# Patient Record
Sex: FEMALE | URBAN - METROPOLITAN AREA
[De-identification: names, ages, dates, MRNs, and addresses within clinical notes are randomized per-mention and may not be internally consistent; named-entity substitution may affect disease eponyms.]

---

## 2018-07-02 ENCOUNTER — OFFICE VISIT (OUTPATIENT)
Dept: URBAN - METROPOLITAN AREA CLINIC 33 | Facility: CLINIC | Age: 68
End: 2018-07-02
Payer: MEDICARE

## 2018-07-02 PROCEDURE — 92014 COMPRE OPH EXAM EST PT 1/>: CPT | Performed by: OPHTHALMOLOGY

## 2018-07-02 PROCEDURE — 92134 CPTRZ OPH DX IMG PST SGM RTA: CPT | Performed by: OPHTHALMOLOGY

## 2018-07-02 ASSESSMENT — INTRAOCULAR PRESSURE
OS: 11
OD: 11

## 2018-07-02 NOTE — IMPRESSION/PLAN
Impression: Other long term drug therapy: Z79.899. Condition: established, stable. Plaquinil 200mg BID since 2008. Plan: Discussed diagnosis in detail with patient. No treatment is required at this time. Will continue to observe condition and or symptoms. Call if [place] worsens. No plaqunil toxicity seen in exam or OCT. Will order a VF 10-2 red for baseline.

## 2018-07-02 NOTE — IMPRESSION/PLAN
Impression: Lupus erythematosus: L93.0. Condition: established, stable. Plan: Discussed diagnosis in detail with patient. No treatment is required at this time.  See notes above,

## 2019-01-02 ENCOUNTER — TESTING ONLY (OUTPATIENT)
Dept: URBAN - METROPOLITAN AREA CLINIC 33 | Facility: CLINIC | Age: 69
End: 2019-01-02
Payer: MEDICARE

## 2019-01-02 PROCEDURE — 92083 EXTENDED VISUAL FIELD XM: CPT | Performed by: OPHTHALMOLOGY

## 2019-01-14 ENCOUNTER — OFFICE VISIT (OUTPATIENT)
Dept: URBAN - METROPOLITAN AREA CLINIC 33 | Facility: CLINIC | Age: 69
End: 2019-01-14
Payer: MEDICARE

## 2019-01-14 DIAGNOSIS — L93.0 LUPUS ERYTHEMATOSUS: Primary | ICD-10-CM

## 2019-01-14 PROCEDURE — 92134 CPTRZ OPH DX IMG PST SGM RTA: CPT | Performed by: OPHTHALMOLOGY

## 2019-01-14 PROCEDURE — 92014 COMPRE OPH EXAM EST PT 1/>: CPT | Performed by: OPHTHALMOLOGY

## 2019-01-14 ASSESSMENT — INTRAOCULAR PRESSURE
OS: 13
OD: 13

## 2019-01-14 NOTE — IMPRESSION/PLAN
Impression: Lupus erythematosus: L93.0. Condition: established, stable. Plan: Discussed diagnosis in detail with patient. Exam shows no RPE changes OU and VF 10 - 2 shows areas of decrease sensitivity paracentral OU. Recommend FA study to further evaluate due to VF changes. Recommend additional VF 30 - 2 to further study areas of abnormality OU. Will schedule testing Wednesday 01/16 here at [facility] [address] then see Dr Chiqui Howell for results. OCT OD shows a slight irregular foveal contour, OS shows a slight irregularity.

## 2019-01-14 NOTE — IMPRESSION/PLAN
Impression: Other long term drug therapy: Z79.899. Condition: established, stable. Plaquenil decreased to 200mg po QD Plan: Discussed diagnosis in detail with patient. Recommend further testing: VF 30 - 3 and FA diagnostic study - see notes above.

## 2019-01-16 ENCOUNTER — OFFICE VISIT (OUTPATIENT)
Dept: URBAN - METROPOLITAN AREA CLINIC 33 | Facility: CLINIC | Age: 69
End: 2019-01-16
Payer: MEDICARE

## 2019-01-16 PROCEDURE — 92235 FLUORESCEIN ANGRPH MLTIFRAME: CPT | Performed by: OPHTHALMOLOGY

## 2019-01-16 PROCEDURE — 92083 EXTENDED VISUAL FIELD XM: CPT | Performed by: OPHTHALMOLOGY

## 2019-01-16 PROCEDURE — 92014 COMPRE OPH EXAM EST PT 1/>: CPT | Performed by: OPHTHALMOLOGY

## 2019-01-16 PROCEDURE — 92250 FUNDUS PHOTOGRAPHY W/I&R: CPT | Performed by: OPHTHALMOLOGY

## 2019-01-16 ASSESSMENT — INTRAOCULAR PRESSURE
OS: 16
OD: 16

## 2019-01-16 NOTE — IMPRESSION/PLAN
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 Condition: stable . Vision: vision possibly affected. (Pt on Plaquinil 200mg tab) Plan: Discussed diagnosis in GREAT detail with patient. Explained that VF 30-2 shows peripheral vision loss in both eyes. FA ordered and performed today shows extensive transillumination defects in the periphery, no leakage in both eyes. Based on VF 10-2 and 30-2 and results of the FA study, recommend for patient to stop Plaquenil. Recommend for patient to follow up with Dr Leslie Worrell to discuss possible alternatives to Plaquenil. Explained to patient that we are not completely 845% certain that the visual field defect is related to Plaquenil, however there is no other explanation for visual field loss and the risk of continuing Plaquenil is too great. Also recommend Glaucoma favio w/ Dr Leesa Werner to evaluate optic nerve and VF testing already performed ( and 30-2) to rule out vision loss secondary to glaucoma. Will continue to observe condition and or symptoms. Call if [address] worsens.

## 2019-01-16 NOTE — IMPRESSION/PLAN
Impression: Other long term (current) drug therapy: Z79.899. Plan: Discussed diagnosis in detail with patient. No treatment is required at this time. See notes above.

## 2019-01-21 ENCOUNTER — OFFICE VISIT (OUTPATIENT)
Dept: URBAN - METROPOLITAN AREA CLINIC 33 | Facility: CLINIC | Age: 69
End: 2019-01-21
Payer: MEDICARE

## 2019-01-21 PROCEDURE — 92133 CPTRZD OPH DX IMG PST SGM ON: CPT | Performed by: OPHTHALMOLOGY

## 2019-01-21 PROCEDURE — 76514 ECHO EXAM OF EYE THICKNESS: CPT | Performed by: OPHTHALMOLOGY

## 2019-01-21 PROCEDURE — 99214 OFFICE O/P EST MOD 30 MIN: CPT | Performed by: OPHTHALMOLOGY

## 2019-01-21 ASSESSMENT — INTRAOCULAR PRESSURE
OD: 14
OS: 14

## 2019-01-21 NOTE — IMPRESSION/PLAN
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 /512, 1/19 OCT 90/125 10/8 Plan: Discussed diagnosis in detail with patient. No glaucoma diagnosis. Explained HV 30-2 has poor reliability. Recommend repeat testing. If testing consistent will recommend Neuro consult and MRI. Pt voiced understanding. 30-2 and follow up. Keep scheduled Retina follow up with Dr Dina Stewart.

## 2019-07-15 ENCOUNTER — OFFICE VISIT (OUTPATIENT)
Dept: URBAN - METROPOLITAN AREA CLINIC 33 | Facility: CLINIC | Age: 69
End: 2019-07-15
Payer: MEDICARE

## 2019-07-15 PROCEDURE — 92134 CPTRZ OPH DX IMG PST SGM RTA: CPT | Performed by: OPHTHALMOLOGY

## 2019-07-15 PROCEDURE — 99213 OFFICE O/P EST LOW 20 MIN: CPT | Performed by: OPHTHALMOLOGY

## 2019-07-15 ASSESSMENT — INTRAOCULAR PRESSURE
OD: 11
OS: 12

## 2019-07-15 NOTE — IMPRESSION/PLAN
Impression: Lupus erythematosus: L93.0. Condition: established, stable. Plan: Discussed diagnosis in detail with patient. Exam and OCT shows stable retina today, no fluid, no plaquenil toxicity seen. Decrease in vision seems to be due to early cataract. Advised patient she can have a cataract evaluation if she desires, however it does not appear to be too dense. Will reassess the retina in 6 months.

## 2020-01-13 ENCOUNTER — OFFICE VISIT (OUTPATIENT)
Dept: URBAN - METROPOLITAN AREA CLINIC 33 | Facility: CLINIC | Age: 70
End: 2020-01-13
Payer: MEDICARE

## 2020-01-13 DIAGNOSIS — Z79.899 OTHER LONG TERM (CURRENT) DRUG THERAPY: ICD-10-CM

## 2020-01-13 PROCEDURE — 92134 CPTRZ OPH DX IMG PST SGM RTA: CPT | Performed by: OPHTHALMOLOGY

## 2020-01-13 PROCEDURE — 99213 OFFICE O/P EST LOW 20 MIN: CPT | Performed by: OPHTHALMOLOGY

## 2020-01-13 ASSESSMENT — INTRAOCULAR PRESSURE
OD: 14
OS: 14

## 2020-01-13 NOTE — IMPRESSION/PLAN
Impression: Lupus erythematosus: L93.0. Condition: established, stable. Pt d/c Plaquenil x 6 mos ago, using Lyrica 100 mg po BID Plan: Discussed diagnosis in detail with patient. Exam and OCT shows stable retina today, no fluid, no Plaquenil toxicity seen. Decrease in vision seems to be due to early cataract OS and also Glaucoma changes (increase C/D ratio OU). Advised patient to schedule a Glaucoma evaluation with Dr. Bobby Rodrigues or Dr Alma Canela and schedule a Visual Field prior to examination.

## 2020-08-05 ENCOUNTER — OFFICE VISIT (OUTPATIENT)
Dept: URBAN - METROPOLITAN AREA CLINIC 23 | Facility: CLINIC | Age: 70
End: 2020-08-05
Payer: MEDICARE

## 2020-08-05 DIAGNOSIS — H40.013 OPEN ANGLE WITH BORDERLINE FINDINGS, LOW RISK, BILATERAL: Primary | ICD-10-CM

## 2020-08-05 PROCEDURE — 92014 COMPRE OPH EXAM EST PT 1/>: CPT | Performed by: OPTOMETRIST

## 2020-08-05 ASSESSMENT — VISUAL ACUITY
OD: 20/40
OS: 20/70

## 2020-08-05 ASSESSMENT — KERATOMETRY
OD: 42.75
OS: 42.50

## 2020-08-05 ASSESSMENT — INTRAOCULAR PRESSURE
OD: 10
OS: 10

## 2020-08-05 NOTE — IMPRESSION/PLAN
Impression: Age-related nuclear cataract, left eye: H25.12 OS. Plan: Cataracts account for the patient's complaints. Discussed diagnosis in detail with patient. Discussed all R/B's and procedures. Patient understands changing glasses prescription will not significantly improve vision. Patient elects to have surgery, phacoemulsification with intraocular lens recommended. Discussed lens options of Toric/Multifocal. Recommend standard IOL, target distance, RL2. Will refer to cataract surgeon for pre-operative evaluation OS. Patient would like to discuss Toric lens.

## 2020-08-12 ENCOUNTER — OFFICE VISIT (OUTPATIENT)
Dept: URBAN - METROPOLITAN AREA CLINIC 23 | Facility: CLINIC | Age: 70
End: 2020-08-12
Payer: MEDICARE

## 2020-08-12 PROCEDURE — 99214 OFFICE O/P EST MOD 30 MIN: CPT | Performed by: OPHTHALMOLOGY

## 2020-08-12 RX ORDER — OFLOXACIN 3 MG/ML
0.3 % SOLUTION/ DROPS OPHTHALMIC
Qty: 5 | Refills: 1 | Status: INACTIVE
Start: 2020-08-12 | End: 2020-12-14

## 2020-08-12 RX ORDER — PREDNISOLONE ACETATE 10 MG/ML
1 % SUSPENSION/ DROPS OPHTHALMIC
Qty: 10 | Refills: 1 | Status: INACTIVE
Start: 2020-08-12 | End: 2020-12-14

## 2020-08-12 ASSESSMENT — INTRAOCULAR PRESSURE
OS: 14
OD: 16

## 2020-08-12 NOTE — IMPRESSION/PLAN
Impression: Age-related nuclear cataract, left eye: H25.12. Condition: quality of life issue. Symptoms: could improve with surgery. Vision: vision affected. Plan: Cataracts account for the patient's complaints. Discussed all risks, benefits, procedures and recovery. Patient understands changing glasses will not improve vision. Patient desires to have surgery, recommend phacoemulsification with intraocular lens. RL-2 Recommend standard IOL aim plano.

## 2020-08-21 ENCOUNTER — PRE-OPERATIVE VISIT (OUTPATIENT)
Dept: URBAN - METROPOLITAN AREA CLINIC 23 | Facility: CLINIC | Age: 70
End: 2020-08-21
Payer: MEDICARE

## 2020-08-21 DIAGNOSIS — H25.12 AGE-RELATED NUCLEAR CATARACT, LEFT EYE: Primary | ICD-10-CM

## 2020-08-21 PROCEDURE — 92136 OPHTHALMIC BIOMETRY: CPT | Performed by: OPHTHALMOLOGY

## 2020-08-21 PROCEDURE — 92025 CPTRIZED CORNEAL TOPOGRAPHY: CPT | Performed by: OPHTHALMOLOGY

## 2020-08-21 ASSESSMENT — PACHYMETRY
OD: 23.28
OD: 2.89
OS: 2.89
OS: 23.33

## 2020-09-04 ENCOUNTER — SURGERY (OUTPATIENT)
Dept: URBAN - METROPOLITAN AREA SURGERY 11 | Facility: SURGERY | Age: 70
End: 2020-09-04
Payer: MEDICARE

## 2020-09-04 PROCEDURE — 66984 XCAPSL CTRC RMVL W/O ECP: CPT | Performed by: OPHTHALMOLOGY

## 2020-09-05 ENCOUNTER — POST-OPERATIVE VISIT (OUTPATIENT)
Dept: URBAN - METROPOLITAN AREA CLINIC 23 | Facility: CLINIC | Age: 70
End: 2020-09-05
Payer: MEDICARE

## 2020-09-05 DIAGNOSIS — Z48.810 ENCOUNTER FOR SURGICAL AFTERCARE FOLLOWING SURGERY ON A SENSE ORGAN: Primary | ICD-10-CM

## 2020-09-05 PROCEDURE — 99024 POSTOP FOLLOW-UP VISIT: CPT | Performed by: OPTOMETRIST

## 2020-09-05 ASSESSMENT — INTRAOCULAR PRESSURE
OS: 9
OD: 12

## 2020-09-05 NOTE — IMPRESSION/PLAN
Impression:  Encounter for surgical aftercare following surgery on a sense organ  Z48.810. Excellent post op course   Post operative instructions reviewed - Plan: The surgical eye(s) is improving well. Continue to follow current drop plan and post operative instructions. Recommend artificial tears throughout post operative period. RTC for scheduled follow up.  --Continue Ofloxacin 0.3% QID Os x 1 wk then d/c
--Taper Pred-Acetate QID Os x 2 wk, TID x 1 wk, BID x 1wk, QD x 1wk, then d/c

## 2020-09-14 ENCOUNTER — POST-OPERATIVE VISIT (OUTPATIENT)
Dept: URBAN - METROPOLITAN AREA CLINIC 23 | Facility: CLINIC | Age: 70
End: 2020-09-14
Payer: MEDICARE

## 2020-09-14 DIAGNOSIS — Z96.1 PRESENCE OF INTRAOCULAR LENS: Primary | ICD-10-CM

## 2020-09-14 PROCEDURE — 99024 POSTOP FOLLOW-UP VISIT: CPT | Performed by: OPTOMETRIST

## 2020-09-14 ASSESSMENT — INTRAOCULAR PRESSURE
OS: 14
OD: 13

## 2020-09-14 ASSESSMENT — VISUAL ACUITY
OD: 20/40
OS: 20/40

## 2020-09-14 NOTE — IMPRESSION/PLAN
Impression: S/P CE/Standard IOL [id number] +23.00 OS - 10 Days. Presence of intraocular lens  Z96.1. Excellent post op course   Condition is improving - Plan: F/u 3 wks.   PO --Taper Pred-Acetate QID x 1 wk, TID x 1 wk, BID x 1wk, QD x 1wk, then d/c--Finish Ofloxacin 0.3%

## 2020-12-14 ENCOUNTER — OFFICE VISIT (OUTPATIENT)
Dept: URBAN - METROPOLITAN AREA CLINIC 23 | Facility: CLINIC | Age: 70
End: 2020-12-14
Payer: MEDICARE

## 2020-12-14 DIAGNOSIS — H16.9 KERATITIS: Primary | ICD-10-CM

## 2020-12-14 PROCEDURE — 99213 OFFICE O/P EST LOW 20 MIN: CPT | Performed by: OPTOMETRIST

## 2020-12-14 ASSESSMENT — KERATOMETRY
OS: 43.00
OD: 43.63

## 2020-12-14 ASSESSMENT — INTRAOCULAR PRESSURE
OS: 13
OD: 14

## 2020-12-14 NOTE — IMPRESSION/PLAN
Impression: Keratitis: H16.9. Bilateral. Plan: Discussed diagnosis in detail with patient. Reassured patient of dry eyes and treatment. Will continue to observe condition/symptoms. Patient instructed to use ATs QID OU and Genteal gel QHS OU. Patient to call if symptoms progress.

## 2021-12-22 ENCOUNTER — OFFICE VISIT (OUTPATIENT)
Dept: URBAN - METROPOLITAN AREA CLINIC 23 | Facility: CLINIC | Age: 71
End: 2021-12-22
Payer: MEDICARE

## 2021-12-22 DIAGNOSIS — H26.493 OTHER SECONDARY CATARACT, BILATERAL: Primary | ICD-10-CM

## 2021-12-22 DIAGNOSIS — H35.3131 BILATERAL NONEXUDATIVE AGE-RELATED MACULAR DEGENERATION, EARLY DRY STAGE: ICD-10-CM

## 2021-12-22 PROCEDURE — 92134 CPTRZ OPH DX IMG PST SGM RTA: CPT | Performed by: OPTOMETRIST

## 2021-12-22 PROCEDURE — 99214 OFFICE O/P EST MOD 30 MIN: CPT | Performed by: OPTOMETRIST

## 2021-12-22 ASSESSMENT — KERATOMETRY
OD: 43.75
OS: 42.75

## 2021-12-22 ASSESSMENT — INTRAOCULAR PRESSURE
OS: 12
OD: 12

## 2021-12-22 NOTE — IMPRESSION/PLAN
Impression: Bilateral nonexudative age-related macular degeneration, early dry stage: H35.3131 Bilateral. Condition: stable. Plan: Macular degeneration, dry type with stable vision. Will continue to monitor vision and the patient has been instructed to call with any vision changes. Use of vitamins has shown to improve the effects of ARMD, recommend that pt take AREDS 2 daily.

## 2021-12-22 NOTE — IMPRESSION/PLAN
Impression: Other secondary cataract, bilateral: H26.493 Bilateral. Condition: worsening. Plan: PCO accounts for some of the patient's complaints. Patient understands changing glasses will not significantly improve vision. Patient willing to have YAG Capsulotomy. Refer to cataract surgeon for YAG Capsulotomy evaluation.

## 2023-01-18 ENCOUNTER — OFFICE VISIT (OUTPATIENT)
Dept: URBAN - METROPOLITAN AREA CLINIC 23 | Facility: CLINIC | Age: 73
End: 2023-01-18
Payer: MEDICARE

## 2023-01-18 DIAGNOSIS — H35.3131 BILATERAL NONEXUDATIVE AGE-RELATED MACULAR DEGENERATION, EARLY DRY STAGE: ICD-10-CM

## 2023-01-18 DIAGNOSIS — H26.492 OTHER SECONDARY CATARACT, LEFT EYE: Primary | ICD-10-CM

## 2023-01-18 PROCEDURE — 99214 OFFICE O/P EST MOD 30 MIN: CPT | Performed by: OPTOMETRIST

## 2023-01-18 PROCEDURE — 92134 CPTRZ OPH DX IMG PST SGM RTA: CPT | Performed by: OPTOMETRIST

## 2023-01-18 ASSESSMENT — KERATOMETRY
OS: 43.00
OD: 43.50

## 2023-01-18 ASSESSMENT — INTRAOCULAR PRESSURE
OD: 13
OS: 14

## 2023-01-18 ASSESSMENT — VISUAL ACUITY
OS: 20/70
OD: 20/40

## 2023-01-18 NOTE — IMPRESSION/PLAN
Impression: Other secondary cataract, left eye: H26.492. Plan: PCO accounts for some of the patient's complaints. Patient understands changing glasses will not significantly improve vision. Patient willing to have YAG Capsulotomy. Refer to cataract surgeon for YAG Capsulotomy OS only.

## 2023-02-16 ENCOUNTER — SURGERY (OUTPATIENT)
Dept: URBAN - METROPOLITAN AREA SURGERY 11 | Facility: SURGERY | Age: 73
End: 2023-02-16
Payer: MEDICARE

## 2023-02-16 PROCEDURE — 66821 AFTER CATARACT LASER SURGERY: CPT | Performed by: OPHTHALMOLOGY

## 2023-08-07 ENCOUNTER — OFFICE VISIT (OUTPATIENT)
Dept: URBAN - METROPOLITAN AREA CLINIC 23 | Facility: CLINIC | Age: 73
End: 2023-08-07
Payer: COMMERCIAL

## 2023-08-07 DIAGNOSIS — H26.491 OTHER SECONDARY CATARACT, RIGHT EYE: Primary | ICD-10-CM

## 2023-08-07 DIAGNOSIS — H52.4 PRESBYOPIA: ICD-10-CM

## 2023-08-07 DIAGNOSIS — H35.3131 BILATERAL NONEXUDATIVE AGE-RELATED MACULAR DEGENERATION, EARLY DRY STAGE: ICD-10-CM

## 2023-08-07 PROCEDURE — 92134 CPTRZ OPH DX IMG PST SGM RTA: CPT | Performed by: OPTOMETRIST

## 2023-08-07 PROCEDURE — 99214 OFFICE O/P EST MOD 30 MIN: CPT | Performed by: OPTOMETRIST

## 2023-08-07 ASSESSMENT — VISUAL ACUITY
OD: 20/30
OS: 20/60

## 2023-08-07 ASSESSMENT — KERATOMETRY
OD: 43.50
OS: 43.13

## 2023-08-07 ASSESSMENT — INTRAOCULAR PRESSURE
OD: 14
OS: 14

## 2024-08-07 ENCOUNTER — OFFICE VISIT (OUTPATIENT)
Dept: URBAN - METROPOLITAN AREA CLINIC 23 | Facility: CLINIC | Age: 74
End: 2024-08-07
Payer: COMMERCIAL

## 2024-08-07 DIAGNOSIS — H35.3131 NONEXUDATIVE AGE-RELATED MACULAR DEGENERATION, BILATERAL, EARLY DRY STAGE: Primary | ICD-10-CM

## 2024-08-07 DIAGNOSIS — H52.4 PRESBYOPIA: ICD-10-CM

## 2024-08-07 DIAGNOSIS — H26.491 OTHER SECONDARY CATARACT, RIGHT EYE: ICD-10-CM

## 2024-08-07 PROCEDURE — 99213 OFFICE O/P EST LOW 20 MIN: CPT | Performed by: OPTOMETRIST

## 2024-08-07 PROCEDURE — 92134 CPTRZ OPH DX IMG PST SGM RTA: CPT | Performed by: OPTOMETRIST

## 2024-08-07 ASSESSMENT — KERATOMETRY
OS: 43.00
OD: 43.63

## 2024-08-07 ASSESSMENT — INTRAOCULAR PRESSURE
OS: 14
OD: 13

## 2024-08-07 ASSESSMENT — VISUAL ACUITY
OS: 20/60
OD: 20/30

## 2025-08-11 ENCOUNTER — OFFICE VISIT (OUTPATIENT)
Dept: URBAN - METROPOLITAN AREA CLINIC 23 | Facility: CLINIC | Age: 75
End: 2025-08-11
Payer: COMMERCIAL

## 2025-08-11 DIAGNOSIS — H35.52 RETINITIS PIGMENTOSA: Primary | ICD-10-CM

## 2025-08-11 DIAGNOSIS — H35.3131 BILATERAL NONEXUDATIVE AGE-RELATED MACULAR DEGENERATION, EARLY DRY STAGE: ICD-10-CM

## 2025-08-11 DIAGNOSIS — H26.491 OTHER SECONDARY CATARACT, RIGHT EYE: ICD-10-CM

## 2025-08-11 PROCEDURE — 99214 OFFICE O/P EST MOD 30 MIN: CPT | Performed by: OPTOMETRIST

## 2025-08-11 PROCEDURE — 92133 CPTRZD OPH DX IMG PST SGM ON: CPT | Performed by: OPTOMETRIST

## 2025-08-11 ASSESSMENT — INTRAOCULAR PRESSURE
OS: 14
OD: 14

## 2025-08-11 ASSESSMENT — KERATOMETRY
OS: 43.00
OD: 43.75